# Patient Record
Sex: MALE | Race: WHITE | NOT HISPANIC OR LATINO
[De-identification: names, ages, dates, MRNs, and addresses within clinical notes are randomized per-mention and may not be internally consistent; named-entity substitution may affect disease eponyms.]

---

## 2017-03-21 ENCOUNTER — RESULT REVIEW (OUTPATIENT)
Age: 62
End: 2017-03-21

## 2017-10-09 ENCOUNTER — OUTPATIENT (OUTPATIENT)
Dept: OUTPATIENT SERVICES | Facility: HOSPITAL | Age: 62
LOS: 1 days | End: 2017-10-09
Payer: COMMERCIAL

## 2017-10-09 ENCOUNTER — APPOINTMENT (OUTPATIENT)
Dept: ULTRASOUND IMAGING | Facility: CLINIC | Age: 62
End: 2017-10-09

## 2017-10-09 DIAGNOSIS — Z00.8 ENCOUNTER FOR OTHER GENERAL EXAMINATION: ICD-10-CM

## 2017-10-09 PROCEDURE — 76770 US EXAM ABDO BACK WALL COMP: CPT

## 2017-10-09 PROCEDURE — 76775 US EXAM ABDO BACK WALL LIM: CPT

## 2017-10-09 PROCEDURE — 76770 US EXAM ABDO BACK WALL COMP: CPT | Mod: 26

## 2017-10-09 PROCEDURE — 76775 US EXAM ABDO BACK WALL LIM: CPT | Mod: 26

## 2019-07-18 ENCOUNTER — NON-APPOINTMENT (OUTPATIENT)
Age: 64
End: 2019-07-18

## 2019-07-18 ENCOUNTER — APPOINTMENT (OUTPATIENT)
Dept: ELECTROPHYSIOLOGY | Facility: CLINIC | Age: 64
End: 2019-07-18
Payer: MEDICARE

## 2019-07-18 VITALS
HEIGHT: 70 IN | HEART RATE: 59 BPM | WEIGHT: 185 LBS | DIASTOLIC BLOOD PRESSURE: 93 MMHG | OXYGEN SATURATION: 98 % | SYSTOLIC BLOOD PRESSURE: 152 MMHG | BODY MASS INDEX: 26.48 KG/M2

## 2019-07-18 DIAGNOSIS — E78.5 HYPERLIPIDEMIA, UNSPECIFIED: ICD-10-CM

## 2019-07-18 DIAGNOSIS — I10 ESSENTIAL (PRIMARY) HYPERTENSION: ICD-10-CM

## 2019-07-18 DIAGNOSIS — Z83.3 FAMILY HISTORY OF DIABETES MELLITUS: ICD-10-CM

## 2019-07-18 DIAGNOSIS — Z85.46 PERSONAL HISTORY OF MALIGNANT NEOPLASM OF PROSTATE: ICD-10-CM

## 2019-07-18 DIAGNOSIS — Z82.49 FAMILY HISTORY OF ISCHEMIC HEART DISEASE AND OTHER DISEASES OF THE CIRCULATORY SYSTEM: ICD-10-CM

## 2019-07-18 DIAGNOSIS — R90.82 WHITE MATTER DISEASE, UNSPECIFIED: ICD-10-CM

## 2019-07-18 DIAGNOSIS — N20.0 CALCULUS OF KIDNEY: ICD-10-CM

## 2019-07-18 DIAGNOSIS — G45.9 TRANSIENT CEREBRAL ISCHEMIC ATTACK, UNSPECIFIED: ICD-10-CM

## 2019-07-18 DIAGNOSIS — Z81.8 FAMILY HISTORY OF OTHER MENTAL AND BEHAVIORAL DISORDERS: ICD-10-CM

## 2019-07-18 PROCEDURE — 93000 ELECTROCARDIOGRAM COMPLETE: CPT

## 2019-07-18 PROCEDURE — 99205 OFFICE O/P NEW HI 60 MIN: CPT

## 2019-07-18 RX ORDER — CLOPIDOGREL BISULFATE 75 MG/1
75 TABLET, FILM COATED ORAL DAILY
Qty: 14 | Refills: 3 | Status: ACTIVE | COMMUNITY

## 2019-07-18 RX ORDER — LISINOPRIL 10 MG/1
10 TABLET ORAL DAILY
Qty: 30 | Refills: 0 | Status: ACTIVE | COMMUNITY

## 2019-07-18 RX ORDER — DONEPEZIL HYDROCHLORIDE 10 MG/1
10 TABLET ORAL
Refills: 0 | Status: ACTIVE | COMMUNITY

## 2019-07-18 RX ORDER — ATORVASTATIN CALCIUM 40 MG/1
40 TABLET, FILM COATED ORAL
Refills: 0 | Status: ACTIVE | COMMUNITY

## 2019-07-18 NOTE — PHYSICAL EXAM
[General Appearance - Well Developed] : well developed [Normal Appearance] : normal appearance [Well Groomed] : well groomed [General Appearance - Well Nourished] : well nourished [No Deformities] : no deformities [General Appearance - In No Acute Distress] : no acute distress [Normal Conjunctiva] : the conjunctiva exhibited no abnormalities [Eyelids - No Xanthelasma] : the eyelids demonstrated no xanthelasmas [Normal Oral Mucosa] : normal oral mucosa [No Oral Pallor] : no oral pallor [No Oral Cyanosis] : no oral cyanosis [Normal Jugular Venous A Waves Present] : normal jugular venous A waves present [Normal Jugular Venous V Waves Present] : normal jugular venous V waves present [No Jugular Venous Hsu A Waves] : no jugular venous hsu A waves [Heart Rate And Rhythm] : heart rate and rhythm were normal [Heart Sounds] : normal S1 and S2 [Murmurs] : no murmurs present [Respiration, Rhythm And Depth] : normal respiratory rhythm and effort [Exaggerated Use Of Accessory Muscles For Inspiration] : no accessory muscle use [Auscultation Breath Sounds / Voice Sounds] : lungs were clear to auscultation bilaterally [Abdomen Soft] : soft [Abdomen Tenderness] : non-tender [Abdomen Mass (___ Cm)] : no abdominal mass palpated [Abnormal Walk] : normal gait [Gait - Sufficient For Exercise Testing] : the gait was sufficient for exercise testing [Nail Clubbing] : no clubbing of the fingernails [Cyanosis, Localized] : no localized cyanosis [Petechial Hemorrhages (___cm)] : no petechial hemorrhages [Skin Color & Pigmentation] : normal skin color and pigmentation [] : no rash [No Venous Stasis] : no venous stasis [Skin Lesions] : no skin lesions [No Skin Ulcers] : no skin ulcer [No Xanthoma] : no  xanthoma was observed [Oriented To Time, Place, And Person] : oriented to person, place, and time [Affect] : the affect was normal [Mood] : the mood was normal [No Anxiety] : not feeling anxious

## 2019-07-18 NOTE — HISTORY OF PRESENT ILLNESS
[FreeTextEntry1] : 64 year old man with a history of hypertension, hyperlipidemia, had a neurologic evaluation for changes in cognition (memory).  MRI imaging showed evidence of multiple strokes.  He had a 30 d monitor to screen for PAF.  This was negative and he presents today for evaluation for possible ILR.  No history of palpitations.  No chest pain. No shortness of breath. No lightheadedness/dizziness.  He does walk for exercise (3 miles 4 days/week).  No change in exercise tolerance.

## 2019-07-18 NOTE — DISCUSSION/SUMMARY
[FreeTextEntry1] : 64 year old man with recurrent neurologic events in the setting of hypertension.  We had a very lengthy discussion about atrial fibrillation and the increased risk for stroke.  As such making a diagnosis could help prevent recurrent events.  External loop monitoring has been unrevealing. We discussed the role of ILR/LINQ.  He will call if he wants to proceed.

## 2020-02-13 ENCOUNTER — OUTPATIENT (OUTPATIENT)
Dept: OUTPATIENT SERVICES | Facility: HOSPITAL | Age: 65
LOS: 1 days | End: 2020-02-13
Payer: MEDICARE

## 2020-02-13 VITALS
WEIGHT: 192.02 LBS | RESPIRATION RATE: 16 BRPM | OXYGEN SATURATION: 98 % | TEMPERATURE: 98 F | SYSTOLIC BLOOD PRESSURE: 144 MMHG | HEART RATE: 100 BPM | DIASTOLIC BLOOD PRESSURE: 83 MMHG

## 2020-02-13 DIAGNOSIS — Z98.890 OTHER SPECIFIED POSTPROCEDURAL STATES: Chronic | ICD-10-CM

## 2020-02-13 DIAGNOSIS — Z01.818 ENCOUNTER FOR OTHER PREPROCEDURAL EXAMINATION: ICD-10-CM

## 2020-02-13 DIAGNOSIS — Z90.89 ACQUIRED ABSENCE OF OTHER ORGANS: Chronic | ICD-10-CM

## 2020-02-13 DIAGNOSIS — N20.1 CALCULUS OF URETER: ICD-10-CM

## 2020-02-13 LAB
HCT VFR BLD CALC: 38.8 % — LOW (ref 39–50)
HGB BLD-MCNC: 12.7 G/DL — LOW (ref 13–17)
MCHC RBC-ENTMCNC: 30.2 PG — SIGNIFICANT CHANGE UP (ref 27–34)
MCHC RBC-ENTMCNC: 32.7 GM/DL — SIGNIFICANT CHANGE UP (ref 32–36)
MCV RBC AUTO: 92.4 FL — SIGNIFICANT CHANGE UP (ref 80–100)
NRBC # BLD: 0 /100 WBCS — SIGNIFICANT CHANGE UP (ref 0–0)
PLATELET # BLD AUTO: 165 K/UL — SIGNIFICANT CHANGE UP (ref 150–400)
RBC # BLD: 4.2 M/UL — SIGNIFICANT CHANGE UP (ref 4.2–5.8)
RBC # FLD: 13.4 % — SIGNIFICANT CHANGE UP (ref 10.3–14.5)
WBC # BLD: 10.81 K/UL — HIGH (ref 3.8–10.5)
WBC # FLD AUTO: 10.81 K/UL — HIGH (ref 3.8–10.5)

## 2020-02-13 PROCEDURE — G0463: CPT

## 2020-02-13 PROCEDURE — 86900 BLOOD TYPING SEROLOGIC ABO: CPT

## 2020-02-13 PROCEDURE — 86901 BLOOD TYPING SEROLOGIC RH(D): CPT

## 2020-02-13 PROCEDURE — 85027 COMPLETE CBC AUTOMATED: CPT

## 2020-02-13 PROCEDURE — 36415 COLL VENOUS BLD VENIPUNCTURE: CPT

## 2020-02-13 PROCEDURE — 86850 RBC ANTIBODY SCREEN: CPT

## 2020-02-13 NOTE — H&P PST ADULT - HISTORY OF PRESENT ILLNESS
63 yo male with PMH of HTN and CVA here for PST. Pt reports to being diagnosed with "right kidney stone about 2 years ago". Pt states he started to have left lower abdominal pain radiating to right flank on 2/9/2020. Pt seen by urologist and sent for CT scan "confirming right kidney stone". Pt rates LLQ abdominal pain to be 6/10 and taking Tylenol PRN with moderate pain relief. Pt denies dysuria, hematuria and voiding difficulties. Pt was started on Cefuroxime BID as prophylaxis this AM by surgeon. Pt electing for cysto-right uteroscopy, holmium laser lithotripsy with stent on 2/19/2020.

## 2020-02-13 NOTE — H&P PST ADULT - GASTROINTESTINAL DETAILS
no masses palpable/no rigidity/bowel sounds normal/no organomegaly/normal/no guarding/no rebound tenderness/no distention/soft/no bruit

## 2020-02-13 NOTE — H&P PST ADULT - NSICDXPROBLEM_GEN_ALL_CORE_FT
PROBLEM DIAGNOSES  Problem: Preoperative testing  Assessment and Plan: Medical clearance needed as per surgeon. CBC, Comprehensive panel, UA, Urine culture and EKG ordered. CMP and EKG received from PCP's office. UA and Urine culture ann at surgeon's office. Pre-op instructions given and pt verbalized understanding.      Problem: Calculus of ureter  Assessment and Plan: Cysto-right uteroscopy, holmium laser lithotripsy with stent on 2/19/2020.

## 2020-02-13 NOTE — H&P PST ADULT - NSICDXPASTMEDICALHX_GEN_ALL_CORE_FT
PAST MEDICAL HISTORY:  CVA (cerebral vascular accident) (Dx with MRI of brain, 2018 & 2019, Pt with short term memory loss and dysphagia, Pt follows with neurologist, last seen in 12/2019, to see again in 3/2020)    Hemochromatosis (Dx in 8/2019, Phlebotomy every 6 weeks, last done 3 weeks ago)    Hyperlipemia     Hypertension     Prostate cancer (Dx in 2017, s/p XRT) PAST MEDICAL HISTORY:  Calculus of ureter     CVA (cerebral vascular accident) (Dx with MRI of brain, 2018 & 2019, Pt with short term memory loss and dysphagia, Pt follows with neurologist, last seen in 12/2019, to see again in 3/2020)    Hemochromatosis (Dx in 8/2019, Phlebotomy every 6 weeks, last done 3 weeks ago)    Hyperlipemia     Hypertension     Prostate cancer (Dx in 2017, s/p XRT)

## 2020-02-13 NOTE — H&P PST ADULT - NSICDXFAMILYHX_GEN_ALL_CORE_FT
FAMILY HISTORY:  Family history of type 2 diabetes mellitus in brother  FH: CAD (coronary artery disease), (Brother alive)  FH: CHF (congestive heart failure), (Mother )  FHx: lung cancer, (Father )

## 2020-02-13 NOTE — H&P PST ADULT - RS GEN PE MLT RESP DETAILS PC
breath sounds equal/airway patent/respirations non-labored/clear to auscultation bilaterally/good air movement/normal

## 2020-02-13 NOTE — H&P PST ADULT - NSICDXPASTSURGICALHX_GEN_ALL_CORE_FT
PAST SURGICAL HISTORY:  H/O lithotripsy (2009 & 1990's)    S/P colonoscopy (2015)    S/P tonsillectomy (Age 4)

## 2020-02-18 ENCOUNTER — TRANSCRIPTION ENCOUNTER (OUTPATIENT)
Age: 65
End: 2020-02-18

## 2020-02-18 NOTE — ASU PATIENT PROFILE, ADULT - MEDICATIONS TO HOLD
per vladislav"s wife, Dr. Franco, the neurologist advised vladislav to continue  clopidogrel . will call dr. muhammad office

## 2020-02-19 ENCOUNTER — OUTPATIENT (OUTPATIENT)
Dept: OUTPATIENT SERVICES | Facility: HOSPITAL | Age: 65
LOS: 1 days | End: 2020-02-19
Payer: MEDICARE

## 2020-02-19 VITALS
SYSTOLIC BLOOD PRESSURE: 158 MMHG | RESPIRATION RATE: 14 BRPM | OXYGEN SATURATION: 100 % | HEART RATE: 100 BPM | TEMPERATURE: 99 F | WEIGHT: 192.02 LBS | DIASTOLIC BLOOD PRESSURE: 94 MMHG

## 2020-02-19 VITALS
SYSTOLIC BLOOD PRESSURE: 165 MMHG | RESPIRATION RATE: 16 BRPM | DIASTOLIC BLOOD PRESSURE: 90 MMHG | HEART RATE: 83 BPM | OXYGEN SATURATION: 100 %

## 2020-02-19 DIAGNOSIS — Z90.89 ACQUIRED ABSENCE OF OTHER ORGANS: Chronic | ICD-10-CM

## 2020-02-19 DIAGNOSIS — Z98.890 OTHER SPECIFIED POSTPROCEDURAL STATES: Chronic | ICD-10-CM

## 2020-02-19 DIAGNOSIS — N20.1 CALCULUS OF URETER: ICD-10-CM

## 2020-02-19 PROCEDURE — 87086 URINE CULTURE/COLONY COUNT: CPT

## 2020-02-19 PROCEDURE — 76000 FLUOROSCOPY <1 HR PHYS/QHP: CPT

## 2020-02-19 PROCEDURE — C1726: CPT

## 2020-02-19 PROCEDURE — C1758: CPT

## 2020-02-19 PROCEDURE — C1889: CPT

## 2020-02-19 PROCEDURE — C1769: CPT

## 2020-02-19 PROCEDURE — C1894: CPT

## 2020-02-19 PROCEDURE — 52356 CYSTO/URETERO W/LITHOTRIPSY: CPT | Mod: RT

## 2020-02-19 PROCEDURE — C2617: CPT

## 2020-02-19 RX ORDER — METOCLOPRAMIDE HCL 10 MG
5 TABLET ORAL ONCE
Refills: 0 | Status: DISCONTINUED | OUTPATIENT
Start: 2020-02-19 | End: 2020-02-19

## 2020-02-19 RX ORDER — CEFTRIAXONE 500 MG/1
1000 INJECTION, POWDER, FOR SOLUTION INTRAMUSCULAR; INTRAVENOUS ONCE
Refills: 0 | Status: COMPLETED | OUTPATIENT
Start: 2020-02-19 | End: 2020-02-19

## 2020-02-19 RX ORDER — LISINOPRIL 2.5 MG/1
1 TABLET ORAL
Qty: 0 | Refills: 0 | DISCHARGE

## 2020-02-19 RX ORDER — PHENAZOPYRIDINE HCL 100 MG
1 TABLET ORAL
Qty: 21 | Refills: 0
Start: 2020-02-19 | End: 2020-02-25

## 2020-02-19 RX ORDER — HYDROMORPHONE HYDROCHLORIDE 2 MG/ML
0.5 INJECTION INTRAMUSCULAR; INTRAVENOUS; SUBCUTANEOUS
Refills: 0 | Status: DISCONTINUED | OUTPATIENT
Start: 2020-02-19 | End: 2020-02-19

## 2020-02-19 RX ORDER — ATORVASTATIN CALCIUM 80 MG/1
1 TABLET, FILM COATED ORAL
Qty: 0 | Refills: 0 | DISCHARGE

## 2020-02-19 RX ORDER — SODIUM CHLORIDE 9 MG/ML
1000 INJECTION, SOLUTION INTRAVENOUS
Refills: 0 | Status: DISCONTINUED | OUTPATIENT
Start: 2020-02-19 | End: 2020-02-19

## 2020-02-19 RX ORDER — PHENAZOPYRIDINE HCL 100 MG
200 TABLET ORAL ONCE
Refills: 0 | Status: COMPLETED | OUTPATIENT
Start: 2020-02-19 | End: 2020-02-19

## 2020-02-19 RX ORDER — CLOPIDOGREL BISULFATE 75 MG/1
1 TABLET, FILM COATED ORAL
Qty: 0 | Refills: 0 | DISCHARGE

## 2020-02-19 RX ORDER — SODIUM CHLORIDE 9 MG/ML
1000 INJECTION INTRAMUSCULAR; INTRAVENOUS; SUBCUTANEOUS
Refills: 0 | Status: DISCONTINUED | OUTPATIENT
Start: 2020-02-19 | End: 2020-02-19

## 2020-02-19 RX ADMIN — SODIUM CHLORIDE 100 MILLILITER(S): 9 INJECTION, SOLUTION INTRAVENOUS at 12:33

## 2020-02-19 RX ADMIN — Medication 100 MILLIGRAM(S): at 13:12

## 2020-02-19 NOTE — BRIEF OPERATIVE NOTE - NSICDXBRIEFPROCEDURE_GEN_ALL_CORE_FT
PROCEDURES:  Right ureteroscopy 19-Feb-2020 11:59:47 retrograde pyelogram, laser lithotripsy, stent insertion Tristian Tate

## 2020-02-19 NOTE — ASU DISCHARGE PLAN (ADULT/PEDIATRIC) - CARE PROVIDER_API CALL
Tristian Tate)  Urology  [address], [address]  [place] [address]  Phone: [phone]  Fax: [phone]  Follow Up Time:

## 2020-02-19 NOTE — ASU DISCHARGE PLAN (ADULT/PEDIATRIC) - COMMENTS
You will be called with arrangements for stent removal in 2 weeks. Have CT scan done in 1 week to ensure complete passage of stone fragments.

## 2020-02-21 LAB
CULTURE RESULTS: SIGNIFICANT CHANGE UP
SPECIMEN SOURCE: SIGNIFICANT CHANGE UP

## 2020-03-05 PROBLEM — C61 MALIGNANT NEOPLASM OF PROSTATE: Chronic | Status: ACTIVE | Noted: 2020-02-13

## 2020-03-05 PROBLEM — N20.1 CALCULUS OF URETER: Chronic | Status: ACTIVE | Noted: 2020-02-13

## 2020-03-05 PROBLEM — I10 ESSENTIAL (PRIMARY) HYPERTENSION: Chronic | Status: ACTIVE | Noted: 2020-02-13

## 2020-03-05 PROBLEM — E78.5 HYPERLIPIDEMIA, UNSPECIFIED: Chronic | Status: ACTIVE | Noted: 2020-02-13

## 2020-03-05 PROBLEM — I63.9 CEREBRAL INFARCTION, UNSPECIFIED: Chronic | Status: ACTIVE | Noted: 2020-02-13

## 2020-03-10 ENCOUNTER — OUTPATIENT (OUTPATIENT)
Dept: OUTPATIENT SERVICES | Facility: HOSPITAL | Age: 65
LOS: 1 days | End: 2020-03-10
Payer: MEDICARE

## 2020-03-10 VITALS
SYSTOLIC BLOOD PRESSURE: 132 MMHG | DIASTOLIC BLOOD PRESSURE: 86 MMHG | WEIGHT: 190.92 LBS | HEIGHT: 70 IN | TEMPERATURE: 98 F | OXYGEN SATURATION: 99 % | RESPIRATION RATE: 14 BRPM | HEART RATE: 84 BPM

## 2020-03-10 DIAGNOSIS — Z01.818 ENCOUNTER FOR OTHER PREPROCEDURAL EXAMINATION: ICD-10-CM

## 2020-03-10 DIAGNOSIS — N20.1 CALCULUS OF URETER: ICD-10-CM

## 2020-03-10 DIAGNOSIS — Z98.890 OTHER SPECIFIED POSTPROCEDURAL STATES: Chronic | ICD-10-CM

## 2020-03-10 DIAGNOSIS — Z90.89 ACQUIRED ABSENCE OF OTHER ORGANS: Chronic | ICD-10-CM

## 2020-03-10 LAB
ALBUMIN SERPL ELPH-MCNC: 3.5 G/DL — SIGNIFICANT CHANGE UP (ref 3.3–5)
ALP SERPL-CCNC: 101 U/L — SIGNIFICANT CHANGE UP (ref 40–120)
ALT FLD-CCNC: 64 U/L — SIGNIFICANT CHANGE UP (ref 12–78)
ANION GAP SERPL CALC-SCNC: 6 MMOL/L — SIGNIFICANT CHANGE UP (ref 5–17)
APPEARANCE UR: CLEAR — SIGNIFICANT CHANGE UP
AST SERPL-CCNC: 41 U/L — HIGH (ref 15–37)
BILIRUB SERPL-MCNC: 0.5 MG/DL — SIGNIFICANT CHANGE UP (ref 0.2–1.2)
BILIRUB UR-MCNC: NEGATIVE — SIGNIFICANT CHANGE UP
BUN SERPL-MCNC: 17 MG/DL — SIGNIFICANT CHANGE UP (ref 7–23)
CALCIUM SERPL-MCNC: 8.7 MG/DL — SIGNIFICANT CHANGE UP (ref 8.5–10.1)
CHLORIDE SERPL-SCNC: 104 MMOL/L — SIGNIFICANT CHANGE UP (ref 96–108)
CO2 SERPL-SCNC: 28 MMOL/L — SIGNIFICANT CHANGE UP (ref 22–31)
COLOR SPEC: YELLOW — SIGNIFICANT CHANGE UP
CREAT SERPL-MCNC: 1.1 MG/DL — SIGNIFICANT CHANGE UP (ref 0.5–1.3)
DIFF PNL FLD: ABNORMAL
GLUCOSE SERPL-MCNC: 94 MG/DL — SIGNIFICANT CHANGE UP (ref 70–99)
GLUCOSE UR QL: NEGATIVE — SIGNIFICANT CHANGE UP
HCT VFR BLD CALC: 37.3 % — LOW (ref 39–50)
HGB BLD-MCNC: 12.3 G/DL — LOW (ref 13–17)
KETONES UR-MCNC: NEGATIVE — SIGNIFICANT CHANGE UP
LEUKOCYTE ESTERASE UR-ACNC: ABNORMAL
MCHC RBC-ENTMCNC: 30.4 PG — SIGNIFICANT CHANGE UP (ref 27–34)
MCHC RBC-ENTMCNC: 33 GM/DL — SIGNIFICANT CHANGE UP (ref 32–36)
MCV RBC AUTO: 92.3 FL — SIGNIFICANT CHANGE UP (ref 80–100)
NITRITE UR-MCNC: NEGATIVE — SIGNIFICANT CHANGE UP
NRBC # BLD: 0 /100 WBCS — SIGNIFICANT CHANGE UP (ref 0–0)
PH UR: 6 — SIGNIFICANT CHANGE UP (ref 5–8)
PLATELET # BLD AUTO: 173 K/UL — SIGNIFICANT CHANGE UP (ref 150–400)
POTASSIUM SERPL-MCNC: 4.3 MMOL/L — SIGNIFICANT CHANGE UP (ref 3.5–5.3)
POTASSIUM SERPL-SCNC: 4.3 MMOL/L — SIGNIFICANT CHANGE UP (ref 3.5–5.3)
PROT SERPL-MCNC: 7.5 G/DL — SIGNIFICANT CHANGE UP (ref 6–8.3)
PROT UR-MCNC: 15
RBC # BLD: 4.04 M/UL — LOW (ref 4.2–5.8)
RBC # FLD: 13.5 % — SIGNIFICANT CHANGE UP (ref 10.3–14.5)
SODIUM SERPL-SCNC: 138 MMOL/L — SIGNIFICANT CHANGE UP (ref 135–145)
SP GR SPEC: 1 — LOW (ref 1.01–1.02)
UROBILINOGEN FLD QL: NEGATIVE — SIGNIFICANT CHANGE UP
WBC # BLD: 3.58 K/UL — LOW (ref 3.8–10.5)
WBC # FLD AUTO: 3.58 K/UL — LOW (ref 3.8–10.5)

## 2020-03-10 PROCEDURE — 36415 COLL VENOUS BLD VENIPUNCTURE: CPT

## 2020-03-10 PROCEDURE — 80053 COMPREHEN METABOLIC PANEL: CPT

## 2020-03-10 PROCEDURE — G0463: CPT

## 2020-03-10 PROCEDURE — 86900 BLOOD TYPING SEROLOGIC ABO: CPT

## 2020-03-10 PROCEDURE — 81001 URINALYSIS AUTO W/SCOPE: CPT

## 2020-03-10 PROCEDURE — 93005 ELECTROCARDIOGRAM TRACING: CPT

## 2020-03-10 PROCEDURE — 93010 ELECTROCARDIOGRAM REPORT: CPT

## 2020-03-10 PROCEDURE — 86850 RBC ANTIBODY SCREEN: CPT

## 2020-03-10 PROCEDURE — 85027 COMPLETE CBC AUTOMATED: CPT

## 2020-03-10 PROCEDURE — 87086 URINE CULTURE/COLONY COUNT: CPT

## 2020-03-10 PROCEDURE — 86901 BLOOD TYPING SEROLOGIC RH(D): CPT

## 2020-03-10 RX ORDER — CEFUROXIME AXETIL 250 MG
1 TABLET ORAL
Qty: 0 | Refills: 0 | DISCHARGE

## 2020-03-10 NOTE — H&P PST ADULT - NSICDXFAMILYHX_GEN_ALL_CORE_FT
FAMILY HISTORY:  Family history of type 2 diabetes mellitus in brother, Brother - Alive Age 70  FH: CAD (coronary artery disease), (Brother alive)- Age 70  FH: CHF (congestive heart failure), (Mother ) Age 54  FHx: lung cancer, (Father ) Age 72

## 2020-03-10 NOTE — H&P PST ADULT - HISTORY OF PRESENT ILLNESS
64 year old male PMH HTN, Stroke, Calculus of Ureter presents with his wife Vasu for PST prior to Cysto-RIGHT Ureteroscopy - Holmium Laser Lithotripsy - Insertion Stent with Dr Tristian Tate on 3/19/2020 - pt notes 64 year old male PMH HTN, Stroke, Calculus of Ureter presents with his wife Vasu for PST prior to Cysto-RIGHT Ureteroscopy - Holmium Laser Lithotripsy - Insertion Stent with Dr Tristian Tate on 3/19/2020 - pt notes H/O Calculus- notes he "has a stone that Dr Tate has been monitoring now for quite a while as he has tried to break it up before and it has not worked." Pt notes he goes for intermittent KUB and CT scans - notes recent scan showed enlargement of stone - notes he underwent Cystoscopy RIGHT Retrograde Pyelogram, RIGHT ureteral Dilation Laser Lithotripsy of Ureteral Calculus /stent insertion here at [facility] 2/19/2020 - Pt now notes he will be having Cysto - insertion stent on 3/19/2020 - denies current visible hematuria - does note some right flank discomfort secondary to stent - notes occasional Dysuria and inability to completely empty his bladder thus needing to urinate frequently- following discussions with Dr Tate pt is electing for scheduled procedure.

## 2020-03-10 NOTE — H&P PST ADULT - NSICDXPASTMEDICALHX_GEN_ALL_CORE_FT
PAST MEDICAL HISTORY:  Calculus of ureter     CVA (cerebral vascular accident) (Dx with MRI of brain, 2018 & 2019, Pt with short term memory loss and dysphagia, Pt follows with neurologist, last seen in 12/2019, to see again in 3/2020)    Hemochromatosis (Dx in 8/2019, Phlebotomy every 6- 8 weeks - last procedure 1/20/2020) Sees Dr Hon De León    Hyperlipemia     Hypertension     Prostate cancer (Dx in 2017, s/p XRT)

## 2020-03-10 NOTE — H&P PST ADULT - GENITOURINARY COMMENTS
Notes Dysuria secondary to the stent- does not feel he can completely empty his bladder so needs to go more frequently Slight tenderness RIGHT Flank secondary to Stent/Calculus

## 2020-03-10 NOTE — H&P PST ADULT - ASSESSMENT
64 year old male - Calculus of Ureter - Scheduled for Cysto- Ureteroscopy - Holmium laser Lithotripsy - Insertion Stent with Dr Tristian Tate on 3/19/2020 -

## 2020-03-10 NOTE — H&P PST ADULT - NSICDXPROBLEM_GEN_ALL_CORE_FT
PROBLEM DIAGNOSES  Problem: Calculus of ureter  Assessment and Plan: PST labs; CBC,, CMP, T&S, U/A, Urine Culture, EKG - Medical Clearance scheduled with Dr Nino on 3/13/2020 - Pt instructed to stop any NSAIDS/herbal Supplements/Multivitamin one week prior to procedure - he may take Tylenol if needed for pain between now and procedure - morning of procedure he may take Lisinopril with small sip of water - as per PCP instructions he may hold other medications morning of procedure - also as per Dr Tate it is OK for patient to REMAIN ON PLAVIX  between now and procedue- pre-op instructions given to both pt and wife with understanding verbalized

## 2020-03-10 NOTE — H&P PST ADULT - NEGATIVE ENMT SYMPTOMS
no vertigo/no hearing difficulty/no tinnitus/no nasal congestion/no dysphagia/no post-nasal discharge/no throat pain/no sinus symptoms

## 2020-03-10 NOTE — H&P PST ADULT - NSICDXPASTSURGICALHX_GEN_ALL_CORE_FT
PAST SURGICAL HISTORY:  H/O lithotripsy (1990's, 2009 & Feb 2020)    S/P colonoscopy (2015)    S/P tonsillectomy (Age 4)

## 2020-03-11 LAB
CULTURE RESULTS: NO GROWTH — SIGNIFICANT CHANGE UP
SPECIMEN SOURCE: SIGNIFICANT CHANGE UP

## 2020-05-08 NOTE — H&P PST ADULT - VISION (WITH CORRECTIVE LENSES IF THE PATIENT USUALLY WEARS THEM):
Patient is stating that he missed a phone call from our clinic.   Could we please try calling him back again, thanks     Normal vision: sees adequately in most situations; can see medication labels, newsprint

## 2020-05-29 PROBLEM — E83.119 HEMOCHROMATOSIS, UNSPECIFIED: Chronic | Status: ACTIVE | Noted: 2020-02-13

## 2020-06-04 ENCOUNTER — OUTPATIENT (OUTPATIENT)
Dept: OUTPATIENT SERVICES | Facility: HOSPITAL | Age: 65
LOS: 1 days | End: 2020-06-04
Payer: MEDICARE

## 2020-06-04 VITALS
HEART RATE: 88 BPM | TEMPERATURE: 98 F | OXYGEN SATURATION: 96 % | SYSTOLIC BLOOD PRESSURE: 162 MMHG | HEIGHT: 70.5 IN | WEIGHT: 192.02 LBS | RESPIRATION RATE: 16 BRPM | DIASTOLIC BLOOD PRESSURE: 89 MMHG

## 2020-06-04 DIAGNOSIS — N20.1 CALCULUS OF URETER: ICD-10-CM

## 2020-06-04 DIAGNOSIS — Z98.890 OTHER SPECIFIED POSTPROCEDURAL STATES: Chronic | ICD-10-CM

## 2020-06-04 DIAGNOSIS — Z90.89 ACQUIRED ABSENCE OF OTHER ORGANS: Chronic | ICD-10-CM

## 2020-06-04 DIAGNOSIS — Z01.818 ENCOUNTER FOR OTHER PREPROCEDURAL EXAMINATION: ICD-10-CM

## 2020-06-04 PROCEDURE — G0463: CPT

## 2020-06-04 NOTE — H&P PST ADULT - NSICDXPROBLEM_GEN_ALL_CORE_FT
PROBLEM DIAGNOSES  Problem: Calculus of ureter  Assessment and Plan: Cysto-RIGHT Ureteroscopy - Holmium Laser Lithotripsy - Insertion Stent with Dr Tristian Tate on 6/11/2020    Problem: Pre-op evaluation  Assessment and Plan: MC and labs done by Dr. Nino on 6/3. Results pending.   To be swabbed for COVID @ [facility]. Appt TBD  Pre op instructions reviewed. May continue Plavix but hold DOS. Take routine am Lisinopril DOS with sip of water. Instructed to avoid NSAIDs and OTC supplements. Verbalized understanding

## 2020-06-04 NOTE — H&P PST ADULT - NSICDXPASTSURGICALHX_GEN_ALL_CORE_FT
PAST SURGICAL HISTORY:  H/O lithotripsy (1990's, 2009 & Feb 2020)    S/P colonoscopy (2015)    S/P tonsillectomy (Age 4) PAST SURGICAL HISTORY:  H/O lithotripsy (1990's, 2009 & Feb 2020)    S/P colonoscopy (2015)    S/P cystoscopy with ureteral stent placement 2/19/2020    S/P tonsillectomy (Age 4)

## 2020-06-04 NOTE — H&P PST ADULT - VISION (WITH CORRECTIVE LENSES IF THE PATIENT USUALLY WEARS THEM):
Wears Reading Glasses Wears Reading Glasses/Normal vision: sees adequately in most situations; can see medication labels, newsprint

## 2020-06-04 NOTE — H&P PST ADULT - HISTORY OF PRESENT ILLNESS
64 year old male PMH HTN, Stroke, Calculus of Ureter presents with his wife Vasu for PST prior to Cysto-RIGHT Ureteroscopy - Holmium Laser Lithotripsy - Insertion Stent with Dr Tristian Tate on 3/19/2020 - pt notes H/O Calculus- notes he "has a stone that Dr Tate has been monitoring now for quite a while as he has tried to break it up before and it has not worked." Pt notes he goes for intermittent KUB and CT scans - notes recent scan showed enlargement of stone - notes he underwent Cystoscopy RIGHT Retrograde Pyelogram, RIGHT ureteral Dilation Laser Lithotripsy of Ureteral Calculus /stent insertion here at [facility] 2/19/2020 - Pt now notes he will be having Cysto - insertion stent on 3/19/2020 - denies current visible hematuria - does note some right flank discomfort secondary to stent - notes occasional Dysuria and inability to completely empty his bladder thus needing to urinate frequently- following discussions with Dr Tate pt is electing for scheduled procedure. 64 year old male PMH HTN, Stroke, Calculus of Ureter presents with his wife Vasu for PST prior to Cysto-RIGHT Ureteroscopy - Holmium Laser Lithotripsy - Insertion Stent with Dr Tristian Tate on 3/19/2020 - pt notes H/O Calculus- notes he "has a stone that Dr Tate has been monitoring now for quite a while as he has tried to break it up before and it has not worked." Pt notes he goes for intermittent KUB and CT scans - notes recent scan showed enlargement of stone - notes he underwent Cystoscopy RIGHT Retrograde Pyelogram, RIGHT ureteral Dilation Laser Lithotripsy of Ureteral Calculus /stent insertion here at [facility] 2/19/2020 - Pt now notes he will be having Cysto - insertion stent on 3/19/2020 - denies current visible hematuria - does note some right flank discomfort secondary to stent - notes occasional Dysuria and inability to completely empty his bladder thus needing to urinate frequently- following discussions with Dr Tate pt is electing for scheduled procedure.     6/4 History noted above. Prev preop and scheduled for March 19 but was cancelled due to COVID. Now rescheduled for 6/11. Renal stone persists. Presently has a right ureteral stent. C/O right flank pain that occasional radiates around to his RLQ especially during urination. Denies hematuria. Denies recent international travel, recent illness and sick contact with COVID in the last 3 weeks     Due to COVID-19 protocol the history is being done via telephone interview and physical exam will be done on admit.

## 2020-06-04 NOTE — H&P PST ADULT - ASSESSMENT
scheduled Cysto-RIGHT Ureteroscopy - Holmium Laser Lithotripsy - Insertion Stent with Dr Tristian Tate on 6/11/2020 6/11- 65 yo male a calculus of the ureter scheduled Cysto-RIGHT Ureteroscopy - Holmium Laser Lithotripsy - Insertion Stent with Dr Tristian Tate on 6/11/2020. Seen in the HR today. Seen and cleared by Dr. Nino. NPO maintained. Chart reviewed and updated with physical exam. Lab ok. COVID negative. Stable for OR

## 2020-06-05 DIAGNOSIS — Z01.818 ENCOUNTER FOR OTHER PREPROCEDURAL EXAMINATION: ICD-10-CM

## 2020-06-05 DIAGNOSIS — N20.1 CALCULUS OF URETER: ICD-10-CM

## 2020-06-05 DIAGNOSIS — Z96.0 PRESENCE OF UROGENITAL IMPLANTS: Chronic | ICD-10-CM

## 2020-06-10 ENCOUNTER — TRANSCRIPTION ENCOUNTER (OUTPATIENT)
Age: 65
End: 2020-06-10

## 2020-06-10 ENCOUNTER — OUTPATIENT (OUTPATIENT)
Dept: OUTPATIENT SERVICES | Facility: HOSPITAL | Age: 65
LOS: 1 days | End: 2020-06-10
Payer: MEDICARE

## 2020-06-10 DIAGNOSIS — Z98.890 OTHER SPECIFIED POSTPROCEDURAL STATES: Chronic | ICD-10-CM

## 2020-06-10 DIAGNOSIS — Z96.0 PRESENCE OF UROGENITAL IMPLANTS: Chronic | ICD-10-CM

## 2020-06-10 DIAGNOSIS — Z90.89 ACQUIRED ABSENCE OF OTHER ORGANS: Chronic | ICD-10-CM

## 2020-06-10 DIAGNOSIS — Z11.59 ENCOUNTER FOR SCREENING FOR OTHER VIRAL DISEASES: ICD-10-CM

## 2020-06-10 LAB — SARS-COV-2 RNA SPEC QL NAA+PROBE: SIGNIFICANT CHANGE UP

## 2020-06-10 PROCEDURE — 87635 SARS-COV-2 COVID-19 AMP PRB: CPT

## 2020-06-10 NOTE — ASU PATIENT PROFILE, ADULT - PSH
H/O lithotripsy  (1990's, 2009 & Feb 2020)  S/P colonoscopy  (2015)  S/P cystoscopy with ureteral stent placement  2/19/2020  S/P tonsillectomy  (Age 4)

## 2020-06-10 NOTE — ASU PATIENT PROFILE, ADULT - VISION (WITH CORRECTIVE LENSES IF THE PATIENT USUALLY WEARS THEM):
ISAR Elder Alert  Before the illness or injury that brought you to the Emergency, did you need someone to help you on a regular basis?: Yes  In the last 24 hours, have you needed more help than usual?: No  Have you been hospitalized for one or more nights during the past 6 months?: Yes  In general, do you have serious problems with your vision, that cannot be corrected with glasses?: Yes(glaucoma)  In general, do you have serious problems with your memory?: No  Do you take six or more different medications every day?: Yes  ISAR Score: 4  Referred to: /(being treated for lung CA, wife lives with patient. patient uses walker. wife assists patient with some ADLs. May benefit from discussing home care services.)     PCP: Rylan Martinez NP [facility]  [address]ULL ext [phone]  T [phone]  F [phone]    POA request made to [facility].   POA received and EMR updated.     Initial SW/CM Assessment/Plan of Care Note     Baseline Assessment  75 year old admitted 4/3/2020 as Inpatient with a diagnosis of NSTEMI .   Prior to admission patient was living with Spouse/significant other and residing at House.  Patient does  have a Power of  for Healthcare.  Document is not activated.  Agent is Ivette-wife.  Patient’s Primary Care Provider is Non- Pcp (Inactive).     Met with pt to introduce CM role and DC planning. Patient arrives from the [facility] for further evaluation of chest pain. Pt was being seen at the [facility] for his chemo treatments for his lung cancer. He notes residing at home with his wife. He uses a walker with ambulation. His family is able to assist as needed. He is familiar with [facility] services as he has had their services in the past after he had fallen. [facility] He is aware CM will continue to follow and assist with DC needs. He states he is not sure what his wife wants and requested CM contact her. Pt informed that the provider has  already updated his wife on the admission plans.     RN will contact wife for pt to speak with on the phone.    CM will continue to follow. Will need [facility] to [facility] at [facility].     Cardiology consulted.  Cardiac echo ordered.     1620: [facility] the [facility] at this time    Medical History  No past medical history on file.    Prior to Admission Status  Functional Status  Ambulation: Independent/Self, Walker  Bathing: Independent/Self  Dressing: Independent/Self  Toileting: Independent/Self  Meal Preparation: Significant Other  Shopping: Significant Other  Medication Preparation: Independent/Self  Medication Administration: Independent/Self  Housekeeping: Significant Other  Transportation: Significant Other, Family, Friends    Agency/Support  Home Devices/Equipment: Mobility assist device  Mobility Assist Devices: Four wheel walker    Current Status  Current Mental Status: Cooperative, Pleasant    Insurance  Primary: [employer]IC[employer]  Secondary: N/A    Barriers to Discharge  Identified Barriers to Discharge/Transition Planning: Assessment/stabilization in progress    Plan  SW/CM - Recommendations for Discharge: Other (comment)(TBD)  Anticipate patient will need post-hospital services. Necessary services are available.  Anticipate patient can potentially return to the environment from which patient entered the hospital.   Anticipate patient can potentially provide self-care at discharge.    Refer to SW/CM Flowsheet for Goals and objective data.            Wears Reading Glasses/Normal vision: sees adequately in most situations; can see medication labels, newsprint

## 2020-06-10 NOTE — ASU PATIENT PROFILE, ADULT - PMH
Calculus of ureter    CVA (cerebral vascular accident)  (Dx with MRI of brain, 2018 & 2019, Pt with short term memory loss and dysphagia, Pt follows with neurologist, last seen in 12/2019, to see again in 3/2020)  Hemochromatosis  (Dx in 8/2019, Phlebotomy every 6- 8 weeks - last procedure 1/20/2020) Sees Dr Hon De León  Hyperlipemia    Hypertension    Prostate cancer  (Dx in 2017, s/p XRT)

## 2020-06-11 ENCOUNTER — RESULT REVIEW (OUTPATIENT)
Age: 65
End: 2020-06-11

## 2020-06-11 ENCOUNTER — OUTPATIENT (OUTPATIENT)
Dept: OUTPATIENT SERVICES | Facility: HOSPITAL | Age: 65
LOS: 1 days | End: 2020-06-11
Payer: MEDICARE

## 2020-06-11 VITALS
RESPIRATION RATE: 14 BRPM | SYSTOLIC BLOOD PRESSURE: 125 MMHG | HEART RATE: 85 BPM | OXYGEN SATURATION: 97 % | DIASTOLIC BLOOD PRESSURE: 75 MMHG

## 2020-06-11 VITALS
RESPIRATION RATE: 18 BRPM | SYSTOLIC BLOOD PRESSURE: 162 MMHG | TEMPERATURE: 98 F | WEIGHT: 190.92 LBS | OXYGEN SATURATION: 97 % | DIASTOLIC BLOOD PRESSURE: 89 MMHG | HEART RATE: 92 BPM | HEIGHT: 70 IN

## 2020-06-11 DIAGNOSIS — N20.1 CALCULUS OF URETER: ICD-10-CM

## 2020-06-11 DIAGNOSIS — Z98.890 OTHER SPECIFIED POSTPROCEDURAL STATES: Chronic | ICD-10-CM

## 2020-06-11 DIAGNOSIS — Z96.0 PRESENCE OF UROGENITAL IMPLANTS: Chronic | ICD-10-CM

## 2020-06-11 DIAGNOSIS — Z01.818 ENCOUNTER FOR OTHER PREPROCEDURAL EXAMINATION: ICD-10-CM

## 2020-06-11 DIAGNOSIS — Z90.89 ACQUIRED ABSENCE OF OTHER ORGANS: Chronic | ICD-10-CM

## 2020-06-11 PROCEDURE — 86900 BLOOD TYPING SEROLOGIC ABO: CPT

## 2020-06-11 PROCEDURE — C1758: CPT

## 2020-06-11 PROCEDURE — 76000 FLUOROSCOPY <1 HR PHYS/QHP: CPT

## 2020-06-11 PROCEDURE — 88300 SURGICAL PATH GROSS: CPT

## 2020-06-11 PROCEDURE — 88300 SURGICAL PATH GROSS: CPT | Mod: 26

## 2020-06-11 PROCEDURE — C1889: CPT

## 2020-06-11 PROCEDURE — 52356 CYSTO/URETERO W/LITHOTRIPSY: CPT | Mod: RT

## 2020-06-11 PROCEDURE — C2617: CPT

## 2020-06-11 PROCEDURE — 36415 COLL VENOUS BLD VENIPUNCTURE: CPT

## 2020-06-11 PROCEDURE — 86850 RBC ANTIBODY SCREEN: CPT

## 2020-06-11 PROCEDURE — C1769: CPT

## 2020-06-11 PROCEDURE — 86901 BLOOD TYPING SEROLOGIC RH(D): CPT

## 2020-06-11 RX ORDER — SODIUM CHLORIDE 9 MG/ML
1000 INJECTION, SOLUTION INTRAVENOUS
Refills: 0 | Status: DISCONTINUED | OUTPATIENT
Start: 2020-06-11 | End: 2020-06-11

## 2020-06-11 RX ORDER — PHENAZOPYRIDINE HCL 100 MG
200 TABLET ORAL ONCE
Refills: 0 | Status: COMPLETED | OUTPATIENT
Start: 2020-06-11 | End: 2020-06-11

## 2020-06-11 RX ORDER — ATORVASTATIN CALCIUM 80 MG/1
1 TABLET, FILM COATED ORAL
Qty: 0 | Refills: 0 | DISCHARGE

## 2020-06-11 RX ORDER — OXYCODONE HYDROCHLORIDE 5 MG/1
5 TABLET ORAL ONCE
Refills: 0 | Status: DISCONTINUED | OUTPATIENT
Start: 2020-06-11 | End: 2020-06-11

## 2020-06-11 RX ORDER — HYDROMORPHONE HYDROCHLORIDE 2 MG/ML
0.5 INJECTION INTRAMUSCULAR; INTRAVENOUS; SUBCUTANEOUS
Refills: 0 | Status: DISCONTINUED | OUTPATIENT
Start: 2020-06-11 | End: 2020-06-11

## 2020-06-11 RX ORDER — PHENAZOPYRIDINE HCL 100 MG
1 TABLET ORAL
Qty: 30 | Refills: 1
Start: 2020-06-11 | End: 2020-06-30

## 2020-06-11 RX ORDER — ONDANSETRON 8 MG/1
4 TABLET, FILM COATED ORAL ONCE
Refills: 0 | Status: DISCONTINUED | OUTPATIENT
Start: 2020-06-11 | End: 2020-06-11

## 2020-06-11 RX ORDER — CEFTRIAXONE 500 MG/1
1000 INJECTION, POWDER, FOR SOLUTION INTRAMUSCULAR; INTRAVENOUS ONCE
Refills: 0 | Status: COMPLETED | OUTPATIENT
Start: 2020-06-11 | End: 2020-06-11

## 2020-06-11 RX ORDER — DONEPEZIL HYDROCHLORIDE 10 MG/1
1 TABLET, FILM COATED ORAL
Qty: 0 | Refills: 0 | DISCHARGE

## 2020-06-11 RX ORDER — CEFUROXIME AXETIL 250 MG
1 TABLET ORAL
Qty: 10 | Refills: 0
Start: 2020-06-11 | End: 2020-06-15

## 2020-06-11 RX ORDER — MEMANTINE HYDROCHLORIDE 10 MG/1
0 TABLET ORAL
Qty: 0 | Refills: 0 | DISCHARGE

## 2020-06-11 RX ORDER — LISINOPRIL 2.5 MG/1
1 TABLET ORAL
Qty: 0 | Refills: 0 | DISCHARGE

## 2020-06-11 RX ORDER — CLOPIDOGREL BISULFATE 75 MG/1
1 TABLET, FILM COATED ORAL
Qty: 0 | Refills: 0 | DISCHARGE

## 2020-06-11 RX ADMIN — SODIUM CHLORIDE 75 MILLILITER(S): 9 INJECTION, SOLUTION INTRAVENOUS at 10:40

## 2020-06-11 RX ADMIN — SODIUM CHLORIDE 75 MILLILITER(S): 9 INJECTION, SOLUTION INTRAVENOUS at 08:19

## 2020-06-11 RX ADMIN — Medication 200 MILLIGRAM(S): at 10:39

## 2020-06-11 NOTE — ASU DISCHARGE PLAN (ADULT/PEDIATRIC) - CARE PROVIDER_API CALL
Tristian Tate  UROLOGY  [address] [place] [address]  Phone: [phone]  Fax: [phone]  Follow Up Time:

## 2020-06-11 NOTE — ASU DISCHARGE PLAN (ADULT/PEDIATRIC) - COMMENTS
Have XRAY (TAMIKO yuen in 2 weeks. Please drop film off at Dr. Tate office. Arrangements will be made for stent removal after XRAY reviewed.

## 2020-06-12 LAB — SURGICAL PATHOLOGY STUDY: SIGNIFICANT CHANGE UP

## 2022-06-07 ENCOUNTER — APPOINTMENT (OUTPATIENT)
Dept: NEUROLOGY | Facility: CLINIC | Age: 67
End: 2022-06-07
Payer: MEDICARE

## 2022-06-07 PROCEDURE — 96139 PSYCL/NRPSYC TST TECH EA: CPT | Mod: NC

## 2022-06-07 PROCEDURE — 96138 PSYCL/NRPSYC TECH 1ST: CPT | Mod: NC

## 2022-06-07 PROCEDURE — 96133 NRPSYC TST EVAL PHYS/QHP EA: CPT

## 2022-06-07 PROCEDURE — 96116 NUBHVL XM PHYS/QHP 1ST HR: CPT

## 2022-06-07 PROCEDURE — 96132 NRPSYC TST EVAL PHYS/QHP 1ST: CPT

## 2022-06-07 PROCEDURE — 96121 NUBHVL XM PHY/QHP EA ADDL HR: CPT

## 2022-06-08 ENCOUNTER — TRANSCRIPTION ENCOUNTER (OUTPATIENT)
Age: 67
End: 2022-06-08

## 2022-06-14 ENCOUNTER — APPOINTMENT (OUTPATIENT)
Dept: NEUROLOGY | Facility: CLINIC | Age: 67
End: 2022-06-14
Payer: MEDICARE

## 2022-06-14 PROCEDURE — 96139 PSYCL/NRPSYC TST TECH EA: CPT | Mod: NC

## 2022-06-14 PROCEDURE — 96133 NRPSYC TST EVAL PHYS/QHP EA: CPT

## 2022-06-29 ENCOUNTER — NON-APPOINTMENT (OUTPATIENT)
Age: 67
End: 2022-06-29

## 2022-07-05 ENCOUNTER — APPOINTMENT (OUTPATIENT)
Dept: NEUROLOGY | Facility: CLINIC | Age: 67
End: 2022-07-05

## 2022-07-05 PROCEDURE — 96133 NRPSYC TST EVAL PHYS/QHP EA: CPT

## 2022-07-27 ENCOUNTER — OUTPATIENT (OUTPATIENT)
Dept: OUTPATIENT SERVICES | Facility: HOSPITAL | Age: 67
LOS: 1 days | End: 2022-07-27
Payer: MEDICARE

## 2022-07-27 ENCOUNTER — APPOINTMENT (OUTPATIENT)
Dept: NUCLEAR MEDICINE | Facility: IMAGING CENTER | Age: 67
End: 2022-07-27

## 2022-07-27 DIAGNOSIS — Z00.8 ENCOUNTER FOR OTHER GENERAL EXAMINATION: ICD-10-CM

## 2022-07-27 DIAGNOSIS — Z98.890 OTHER SPECIFIED POSTPROCEDURAL STATES: Chronic | ICD-10-CM

## 2022-07-27 DIAGNOSIS — Z96.0 PRESENCE OF UROGENITAL IMPLANTS: Chronic | ICD-10-CM

## 2022-07-27 DIAGNOSIS — Z90.89 ACQUIRED ABSENCE OF OTHER ORGANS: Chronic | ICD-10-CM

## 2022-07-27 PROCEDURE — 78608 BRAIN IMAGING (PET): CPT | Mod: 26,MH

## 2022-07-28 PROCEDURE — 78999 UNLISTED MISC PX DX NUC MED: CPT | Mod: 26,MH

## 2022-07-28 PROCEDURE — 78608 BRAIN IMAGING (PET): CPT | Mod: MH

## 2022-07-28 PROCEDURE — A9552: CPT

## 2022-07-28 PROCEDURE — 78999 UNLISTED MISC PX DX NUC MED: CPT | Mod: MH

## 2023-08-16 ENCOUNTER — APPOINTMENT (OUTPATIENT)
Dept: MRI IMAGING | Facility: CLINIC | Age: 68
End: 2023-08-16
Payer: MEDICARE

## 2023-08-16 ENCOUNTER — RESULT REVIEW (OUTPATIENT)
Age: 68
End: 2023-08-16

## 2023-08-16 ENCOUNTER — OUTPATIENT (OUTPATIENT)
Dept: OUTPATIENT SERVICES | Facility: HOSPITAL | Age: 68
LOS: 1 days | End: 2023-08-16
Payer: MEDICARE

## 2023-08-16 ENCOUNTER — APPOINTMENT (OUTPATIENT)
Dept: RADIOLOGY | Facility: CLINIC | Age: 68
End: 2023-08-16
Payer: MEDICARE

## 2023-08-16 DIAGNOSIS — Z98.890 OTHER SPECIFIED POSTPROCEDURAL STATES: Chronic | ICD-10-CM

## 2023-08-16 DIAGNOSIS — Z00.8 ENCOUNTER FOR OTHER GENERAL EXAMINATION: ICD-10-CM

## 2023-08-16 DIAGNOSIS — Z90.89 ACQUIRED ABSENCE OF OTHER ORGANS: Chronic | ICD-10-CM

## 2023-08-16 DIAGNOSIS — Z96.0 PRESENCE OF UROGENITAL IMPLANTS: Chronic | ICD-10-CM

## 2023-08-16 PROCEDURE — 70551 MRI BRAIN STEM W/O DYE: CPT | Mod: 26,MH

## 2023-08-16 PROCEDURE — 70551 MRI BRAIN STEM W/O DYE: CPT

## 2023-11-03 NOTE — ASU PREOP CHECKLIST - ALLERGY BAND ON
Date of last visit:  11/1/2023  Date of next visit:  Visit date not found    Requested Prescriptions     Pending Prescriptions Disp Refills    LORazepam (ATIVAN) 1 MG tablet 30 tablet 5     Sig: Take 1 tablet by mouth daily as needed for Anxiety for up to 180 days. She needs an appointment for more.  Max Daily Amount: 1 mg
Pt called stating [facility] does not have the RX  Lorazepam in stock and not sure when getting the med in.     Pt ask if RX could be sent to    [facility]
no known allergies

## 2023-12-27 NOTE — ASU PATIENT PROFILE, ADULT - ARRIVAL TIME
EMERGENCY DEPARTMENT ENCOUNTER      NAME: Rogerio Calvillo  AGE: 38 year old male  YOB: 1985  MRN: [id number]  EVALUATION DATE & TIME: No admission date for patient encounter.    PCP: System, Provider Not In    ED PROVIDER: Christin Santiago PA-C      Chief Complaint   Patient presents with    Nasal Congestion    Cough    Pharyngitis     FINAL IMPRESSION:  1. Strep pharyngitis    2. Influenza A          ED COURSE & MEDICAL DECISION MAKING:    Pertinent Labs & Imaging studies reviewed. (See chart for details)  38 year old male presents to the Emergency Department for evaluation of cough, congestion, runny nose and a sore throat.  Patient's been experiencing the symptoms for the past 3 days.  Patient has been taking Robitussin and Mucinex with relief.  Vital signs reviewed and patient is hypertensive most likely due to symptoms.  Afebrile.  On exam, posterior oropharynx is not erythematous.  Uvula is midline.  Trachea is midline. Tonsils are symmetric bilaterally.  No exudate.  No tonsillar swelling. Normal range of motion of the neck.  Cardiac and pulm exams unremarkable.  No wheezing.  No respiratory distress.  Exam is unremarkable.    Differential diagnosis includes viral illness, COVID, influenza, RSV, strep, pneumonia, pneumothorax, hemothorax.  PE is unlikely due to patient being PERC negative.  Patient is strep positive and influenza positive.  COVID and RSV is negative.  Chest x-ray was negative.  Patient was given a dose of amoxicillin here in the emergency room.  Patient was also given Tessalon Perles and Tylenol to help reduce his cough and body aches.  Patient was prescribed Tessalon Perles, Tylenol and amoxicillin.  Patient will return to the ED if new symptoms develop or symptoms worsen.  Patient will follow-up with his primary care doctor discuss his ED visit.  All questions answered.  Patient agrees with plan.      ED COURSE:   12:32 PM  I saw the patient.   1:17 PM  I updated the  patient on his lab results and imaging results.  Patient be discharged home.  Patient agrees with plan.  All questions answered.       At the conclusion of the encounter I discussed the results of all of the tests and the disposition. The questions were answered. The patient or family acknowledged understanding and was agreeable with the care plan.     0 minutes of critical care time       Additional Documentation    History:  Supplemental history from: Documented in chart, if applicable  External Record(s) reviewed: Documented in chart, if applicable.    Work Up:  Chart documentation includes differential considered and any EKGs or imaging interpreted by provider.  In additional to work up documented, I considered the following work up: Documented in chart, if applicable.    External consultation:  Discussion of management with another provider: Documented in chart, if applicable    Complicating factors:  Care impacted by chronic illness: N/A  Care affected by social determinants of health: N/A    Disposition considerations: Discharge. I prescribed additional prescription strength medication(s) as charted. See documentation for any additional details.      MEDICATIONS GIVEN IN THE EMERGENCY:  Medications   benzonatate (TESSALON) capsule 100 mg (has no administration in time range)   amoxicillin (AMOXIL) capsule 500 mg (has no administration in time range)   acetaminophen (TYLENOL) tablet 650 mg (has no administration in time range)       NEW PRESCRIPTIONS STARTED AT TODAY'S ER VISIT  New Prescriptions    ACETAMINOPHEN (TYLENOL) 500 MG TABLET    Take 1 tablet (500 mg) by mouth every 6 hours as needed for pain or fever    AMOXICILLIN (AMOXIL) 500 MG CAPSULE    Take 1 capsule (500 mg) by mouth 3 times daily for 10 days    BENZONATATE (TESSALON) 100 MG CAPSULE    Take 1 capsule (100 mg) by mouth 3 times daily as needed for cough    =================================================================    HPI    Patient  information was obtained from: patient    Use of : N/A       Rogerio Calvillo is a 38 year old male with a pertinent history of BYRON, chronic sinus infection who presents to this ED for evaluation of cough.     For the past 3 days patient has had cough, congestion, runny nose and a mild sore throat.  Patient reports that he has been taking Robitussin and Mucinex with relief.  This morning patient did not take any medications.  He also complains of chills and bodyaches. No one is sick at home.    Denies chest pain, shortness of breath, nausea, vomiting, diarrhea, urinary symptoms.    REVIEW OF SYSTEMS   Review of Systems   Constitutional:  Negative for chills and fever.   HENT:  Positive for congestion and sore throat.    Respiratory:  Positive for cough. Negative for shortness of breath.    Cardiovascular:  Negative for chest pain and palpitations.   Genitourinary: Negative.    Musculoskeletal:         Body aches     Neurological:  Negative for dizziness, light-headedness and headaches.       PAST MEDICAL HISTORY:  Past Medical History:   Diagnosis Date    Anxiety        PAST SURGICAL HISTORY:  Past Surgical History:   Procedure Laterality Date    MIDDLE EAR SURGERY      OTHER SURGICAL HISTORY  2019    orif right ankle       CURRENT MEDICATIONS:    acetaminophen (TYLENOL) 500 MG tablet  amoxicillin (AMOXIL) 500 MG capsule  benzonatate (TESSALON) 100 MG capsule  buPROPion (WELLBUTRIN XL) 150 MG 24 hr tablet  multivitamin therapeutic tablet  valACYclovir (VALTREX) 1000 mg tablet        ALLERGIES:  No Known Allergies    FAMILY HISTORY:  Family History   Problem Relation Age of Onset    Diabetes Mother     Hypertension Mother     No Known Problems Father     Cancer Maternal Grandmother     Early Death Maternal Grandfather     Heart Disease Maternal Grandfather     No Known Problems Paternal Grandmother     Heart Disease Paternal Grandfather        SOCIAL HISTORY:   Social History     Socioeconomic History     "Marital status: Single     Spouse name: None    Number of children: None    Years of education: None    Highest education level: None   Tobacco Use    Smoking status: Never     Passive exposure: Past    Smokeless tobacco: Never   Vaping Use    Vaping Use: Never used   Substance and Sexual Activity    Alcohol use: Yes     Comment: Alcoholic Drinks/day: 1-2/month    Drug use: No    Sexual activity: Not Currently     Social Determinants of Health     Interpersonal Safety: Low Risk  (12/4/2023)    Interpersonal Safety     Do you feel physically and emotionally safe where you currently live?: Yes     Within the past 12 months, have you been hit, slapped, kicked or otherwise physically hurt by someone?: No     Within the past 12 months, have you been humiliated or emotionally abused in other ways by your partner or ex-partner?: No       VITALS:  BP (!) 140/95   Pulse 73   Temp 99.3  F (37.4  C) (Temporal)   Resp 18   Ht 1.778 m (5' 10\")   Wt 88.5 kg (195 lb)   SpO2 95%   BMI 27.98 kg/m      PHYSICAL EXAM    Physical Exam  Vitals and nursing note reviewed.   Constitutional:       General: He is not in acute distress.     Appearance: Normal appearance. He is not ill-appearing, toxic-appearing or diaphoretic.   HENT:      Head: Atraumatic.      Jaw: There is normal jaw occlusion.      Right Ear: Hearing, tympanic membrane, ear canal and external ear normal.      Left Ear: Hearing, tympanic membrane, ear canal and external ear normal.      Nose: Nose normal.      Right Sinus: No maxillary sinus tenderness or frontal sinus tenderness.      Left Sinus: No maxillary sinus tenderness or frontal sinus tenderness.      Mouth/Throat:      Mouth: Mucous membranes are moist.      Tongue: No lesions. Tongue does not deviate from midline.      Palate: No mass and lesions.      Pharynx: Oropharynx is clear. Uvula midline. No pharyngeal swelling, oropharyngeal exudate, posterior oropharyngeal erythema or uvula swelling.      " Tonsils: No tonsillar exudate or tonsillar abscesses.   Eyes:      Conjunctiva/sclera: Conjunctivae normal.      Pupils: Pupils are equal, round, and reactive to light.   Neck:      Trachea: Trachea and phonation normal.   Cardiovascular:      Rate and Rhythm: Normal rate and regular rhythm.      Pulses: Normal pulses.      Heart sounds: Normal heart sounds. No murmur heard.     No friction rub. No gallop.   Pulmonary:      Effort: Pulmonary effort is normal.      Breath sounds: Normal breath sounds. No wheezing or rales.   Abdominal:      Tenderness: There is no abdominal tenderness. There is no guarding or rebound.   Musculoskeletal:      Cervical back: Full passive range of motion without pain and normal range of motion. No signs of trauma, rigidity or crepitus. Normal range of motion.   Lymphadenopathy:      Cervical: No cervical adenopathy.   Skin:     General: Skin is dry.   Neurological:      Mental Status: He is alert. Mental status is at baseline.   Psychiatric:         Mood and Affect: Mood normal.         Thought Content: Thought content normal.        LAB:  All pertinent labs reviewed and interpreted.  Labs Ordered and Resulted from Time of ED Arrival to Time of ED Departure   INFLUENZA A/B, RSV, & SARS-COV2 PCR - Abnormal       Result Value    Influenza A PCR Positive (*)     Influenza B PCR Negative      RSV PCR Negative      SARS CoV2 PCR Negative     GROUP A STREPTOCOCCUS PCR THROAT SWAB - Abnormal    Group A strep by PCR Detected (*)         RADIOLOGY:  Reviewed all pertinent imaging. Please see official radiology report.  Chest XR,  PA & LAT   Final Result   IMPRESSION: Negative chest.          MAYUR Mitchell [facility] EMERGENCY ROOM  [address] [facility] [address] [id number] [phone]     Christin Santiago PA-C  12/27/23 1920     07:45

## 2024-01-04 ENCOUNTER — APPOINTMENT (OUTPATIENT)
Dept: ULTRASOUND IMAGING | Facility: CLINIC | Age: 69
End: 2024-01-04
Payer: MEDICARE

## 2024-01-04 ENCOUNTER — OUTPATIENT (OUTPATIENT)
Dept: OUTPATIENT SERVICES | Facility: HOSPITAL | Age: 69
LOS: 1 days | End: 2024-01-04
Payer: MEDICARE

## 2024-01-04 DIAGNOSIS — Z96.0 PRESENCE OF UROGENITAL IMPLANTS: Chronic | ICD-10-CM

## 2024-01-04 DIAGNOSIS — Z85.46 PERSONAL HISTORY OF MALIGNANT NEOPLASM OF PROSTATE: ICD-10-CM

## 2024-01-04 DIAGNOSIS — Z90.89 ACQUIRED ABSENCE OF OTHER ORGANS: Chronic | ICD-10-CM

## 2024-01-04 DIAGNOSIS — Z98.890 OTHER SPECIFIED POSTPROCEDURAL STATES: Chronic | ICD-10-CM

## 2024-01-04 DIAGNOSIS — N62 HYPERTROPHY OF BREAST: ICD-10-CM

## 2024-01-04 DIAGNOSIS — E83.110 HEREDITARY HEMOCHROMATOSIS: ICD-10-CM

## 2024-01-04 PROCEDURE — 76700 US EXAM ABDOM COMPLETE: CPT

## 2024-01-04 PROCEDURE — 76700 US EXAM ABDOM COMPLETE: CPT | Mod: 26

## 2024-06-13 NOTE — H&P PST ADULT - LYMPH NODES
If biopsy is negative you will need a cystoscopy in 2 years.    Please call to follow up in 1 year pending today's biopsy result.        Kris Kan MD  Office: [phone]             Bladder Biopsy:  The doctor will put a thin, lighted tool called a cystoscope, or scope, into your urethra. The urethra is the tube that carries urine from the bladder to the outside of the body. Then the doctor will gently thread the scope into your bladder. Your bladder will then be filled with fluid. This stretches the bladder so that your doctor can clearly see the inside of your bladder. Your doctor will use small surgical tools through the scope to remove and/or burn away any cancer cells.    What can you expect after Bladder Biopsy?  You may feel the need to urinate often for a while after the surgery. But this should improve with time. It may burn when you urinate. Drink lots of fluids to help with the burning. Your urine also may look pink for up to 2 to 3 weeks after surgery. This is because there may be blood in it.    You may have to avoid strenuous activity and heavy lifting for about 1-2 weeks after surgery.  Your doctor may suggest that you have chemotherapy or biological therapy after the procedure.    Bladder cancer can come back. You will need regular exams for the rest of your life to check for the cancer. You may need the surgery again.    Follow-up care is a key part of your treatment and safety. Be sure to make and go to all appointments, and call your doctor if you are having problems. It's also a good idea to know your test results and keep a list of the medicines you take.    © 4046-8922 [employer]. Care instructions adapted under license by [employer]. If you have questions about a medical condition or this instruction, always ask your healthcare professional. [employer] disclaims any warranty or liability for your use of this information.    not examined

## 2025-01-23 ENCOUNTER — OUTPATIENT (OUTPATIENT)
Dept: OUTPATIENT SERVICES | Facility: HOSPITAL | Age: 70
LOS: 1 days | End: 2025-01-23
Payer: MEDICARE

## 2025-01-23 ENCOUNTER — APPOINTMENT (OUTPATIENT)
Dept: ULTRASOUND IMAGING | Facility: CLINIC | Age: 70
End: 2025-01-23
Payer: MEDICARE

## 2025-01-23 DIAGNOSIS — N62 HYPERTROPHY OF BREAST: ICD-10-CM

## 2025-01-23 DIAGNOSIS — Z98.890 OTHER SPECIFIED POSTPROCEDURAL STATES: Chronic | ICD-10-CM

## 2025-01-23 DIAGNOSIS — Z90.89 ACQUIRED ABSENCE OF OTHER ORGANS: Chronic | ICD-10-CM

## 2025-01-23 DIAGNOSIS — E83.110 HEREDITARY HEMOCHROMATOSIS: ICD-10-CM

## 2025-01-23 DIAGNOSIS — Z96.0 PRESENCE OF UROGENITAL IMPLANTS: Chronic | ICD-10-CM

## 2025-01-23 PROCEDURE — 76700 US EXAM ABDOM COMPLETE: CPT

## 2025-01-23 PROCEDURE — 76700 US EXAM ABDOM COMPLETE: CPT | Mod: 26
